# Patient Record
Sex: MALE | Race: WHITE
[De-identification: names, ages, dates, MRNs, and addresses within clinical notes are randomized per-mention and may not be internally consistent; named-entity substitution may affect disease eponyms.]

---

## 2021-10-11 ENCOUNTER — HOSPITAL ENCOUNTER (OUTPATIENT)
Dept: HOSPITAL 75 - PREOP | Age: 66
LOS: 1 days | Discharge: HOME | End: 2021-10-12
Attending: SURGERY
Payer: MEDICARE

## 2021-10-11 VITALS — WEIGHT: 212.75 LBS | BODY MASS INDEX: 31.51 KG/M2 | HEIGHT: 69.02 IN

## 2021-10-11 DIAGNOSIS — Z01.818: Primary | ICD-10-CM

## 2021-10-18 ENCOUNTER — HOSPITAL ENCOUNTER (OUTPATIENT)
Dept: HOSPITAL 75 - ENDO | Age: 66
Discharge: HOME | End: 2021-10-18
Attending: SURGERY
Payer: MEDICARE

## 2021-10-18 VITALS — SYSTOLIC BLOOD PRESSURE: 130 MMHG | DIASTOLIC BLOOD PRESSURE: 89 MMHG

## 2021-10-18 VITALS — HEIGHT: 69.02 IN | BODY MASS INDEX: 31.51 KG/M2 | WEIGHT: 212.75 LBS

## 2021-10-18 VITALS — DIASTOLIC BLOOD PRESSURE: 57 MMHG | SYSTOLIC BLOOD PRESSURE: 87 MMHG

## 2021-10-18 VITALS — SYSTOLIC BLOOD PRESSURE: 105 MMHG | DIASTOLIC BLOOD PRESSURE: 67 MMHG

## 2021-10-18 VITALS — DIASTOLIC BLOOD PRESSURE: 66 MMHG | SYSTOLIC BLOOD PRESSURE: 101 MMHG

## 2021-10-18 VITALS — SYSTOLIC BLOOD PRESSURE: 92 MMHG | DIASTOLIC BLOOD PRESSURE: 60 MMHG

## 2021-10-18 DIAGNOSIS — Z79.82: ICD-10-CM

## 2021-10-18 DIAGNOSIS — D12.2: Primary | ICD-10-CM

## 2021-10-18 DIAGNOSIS — K64.8: ICD-10-CM

## 2021-10-18 DIAGNOSIS — F17.210: ICD-10-CM

## 2021-10-18 DIAGNOSIS — K57.30: ICD-10-CM

## 2021-10-18 DIAGNOSIS — D12.8: ICD-10-CM

## 2021-10-18 DIAGNOSIS — D12.3: ICD-10-CM

## 2021-10-18 DIAGNOSIS — Z88.0: ICD-10-CM

## 2021-10-18 NOTE — PROGRESS NOTE-POST OPERATIVE
Post-Operative Progess Note


Surgeon (s)/Assistant (s)


Surgeon


KATALINA CALDERON DO


Assistant:  YANNA NuñezII





Pre-Operative Diagnosis


+ Cologuard





Post-Operative Diagnosis





polyps


diverticula


int hemorrhoids





Procedure & Operative Findings


Date of Procedure


10/18/21


Procedure Performed/Findings


Colon with snare polypectomy





PROCEDURE NOTE:


After informed consent was obtained, the patient was brought to the endoscopy


suite, placed in bed in left lateral decubitus position.  He  was administered


IV sedation by the CRNA who then monitored his vitals the entire time, heart


rate, blood pressure and pulse ox and the scope was inserted, pushed all the way


to about 130 cm and pushed into the cecum, took a picture of appendiceal orifice

and then


slowly withdrew the scope insufflating to look circumferentially at the walls 

starting


in the cecum and up the ascending colon. In the ascending colon I found two 

polyps


which were removed with snare polypectomy and then suctioned them up.  Continued


up to the hepatic flexure and then down the transverse colon.  Found another 

polyp


here and did another snare polypectomy.  Continued to the splenic flexure, into 

the 


descending colon and down into the sigmoid; where I saw a few small diverticula.

 Finally


into the rectal vault where I found another small polyp; which was removed with 

snare


and may have actually seen 2 more small polyps.  I retroflexed the scope and 

took a


picture of the internal hemorrhoids.  





The patient tolerated the procedure.  He was recovered in endoscopy suite.


Anesthesia Type


IV sedation by CRNA





Estimated Blood Loss


Estimated blood loss (mL):  scant





Specimens/Packing


Specimens Removed


asc polyp x 2


transverse polyp


rectal polyp











KATALINA CALDERON DO               Oct 18, 2021 09:12

## 2021-10-18 NOTE — ENDOSCOPY DISCHARGE INSTRUCT
Endo Procedure/Findings


Findings


1.:  Polyp


2.:  Diverticulosis


3.:  Internal Hemorrhoids





Discharge Instructions


-


Activity: You might feel a little sleepy until tomorrow.  This is due to the 

medicine you received to relax you.





Until tomorrow, you should:  


   NOT drive a car, operate machinery or power tools.


   NOT drink any alcoholic beverages.


   NOT make any important decisions or sign importortant papers.





Do not return to work until tomorrow, unless otherwise instructed. Resume 

previous activities tomorrow.





Diet: Start by taking liquids.  If you tolerate liquids, advance to solid food.


1.:  Colonscopy in 3 years





Notify Physician


-


If you experience excessive bleeding, unusual abdominal pain, fever, or chest 

pain, contact your doctor immediately.











KATALINA CALDERON DO               Oct 18, 2021 09:13

## 2021-10-18 NOTE — ANESTHESIA-GENERAL POST-OP
MAC


Patient Condition


Mental Status/LOC:  Same as Preop


Cardiovascular:  Satisfactory


Nausea/Vomiting:  Absent


Respiratory:  Satisfactory


Pain:  Controlled


Complications:  Absent





Post Op Complications


Complications


None





Follow Up Care/Instructions


Patient Instructions


None needed.





Anesthesiology Discharge Order


Discharge Order


Patient is doing well, no complaints, stable vital signs, no apparent adverse 

anesthesia problems.   


No complications reported per nursing.











FITZ ANNE CRNA           Oct 18, 2021 12:01

## 2021-10-18 NOTE — PROGRESS NOTE-PRE OPERATIVE
Pre-Operative Progress Note


H&P Reviewed


The H&P was reviewed, patient examined and no changes noted.


Time Seen by Provider:  08:09


Date H&P Reviewed:  Oct 18, 2021


Time H&P Reviewed:  08:09


Pre-Operative Diagnosis:  + ColKATALINA Mullins DO               Oct 18, 2021 08:11